# Patient Record
Sex: MALE | Race: WHITE | NOT HISPANIC OR LATINO | Employment: STUDENT | URBAN - METROPOLITAN AREA
[De-identification: names, ages, dates, MRNs, and addresses within clinical notes are randomized per-mention and may not be internally consistent; named-entity substitution may affect disease eponyms.]

---

## 2021-03-30 ENCOUNTER — EVALUATION (OUTPATIENT)
Dept: PHYSICAL THERAPY | Facility: CLINIC | Age: 21
End: 2021-03-30

## 2021-03-30 DIAGNOSIS — S43.61XA SPRAIN OF RIGHT STERNOCLAVICULAR JOINT, INITIAL ENCOUNTER: Primary | ICD-10-CM

## 2021-03-30 NOTE — PROGRESS NOTES
PT Evaluation     Today's date: 3/30/2021  Patient name: Cait Godfrey  : 2000  MRN: [id number]  Referring provider: Kaylyn Sharma, PT  Dx:   Encounter Diagnosis     ICD-10-CM    1  Sprain of right sternoclavicular joint, initial encounter  S43  61XA                   Assessment/Plan   Pt presents with Right SC Joint Sprain as well as Costochondritis due to trauma  Pt was instructed in gentle mobility to preserve ROM and gentle isometrics to preserve muscle strength  Pt will follow up in a few days to monitor progress and refer appropriately    Subjective   Patient reported he was blind sided on Saturday at lax practice  Reports was checked from the right and had immediate right sided chest and shoulder pain  Reports pain with elevation of right shoulder  Unable to lie on right side    Objective  TTP right SC joint as well as along sternum at multiple costochondral joints    Edema present in medial pec and below clavicle    Pt's right shoulder AROM limited due to pain        Precautions: Standard      Manuals                                                                 Neuro Re-Ed                                                                                                        Ther Ex                                                                                                                     Ther Activity                                       Gait Training                                       Modalities

## 2022-07-11 ENCOUNTER — APPOINTMENT (OUTPATIENT)
Dept: RADIOLOGY | Facility: CLINIC | Age: 22
End: 2022-07-11
Payer: COMMERCIAL

## 2022-07-11 VITALS
WEIGHT: 190 LBS | SYSTOLIC BLOOD PRESSURE: 127 MMHG | BODY MASS INDEX: 26.6 KG/M2 | DIASTOLIC BLOOD PRESSURE: 76 MMHG | HEART RATE: 85 BPM | HEIGHT: 71 IN

## 2022-07-11 DIAGNOSIS — M23.91 INTERNAL DERANGEMENT OF RIGHT KNEE: Primary | ICD-10-CM

## 2022-07-11 DIAGNOSIS — M25.561 RIGHT KNEE PAIN, UNSPECIFIED CHRONICITY: ICD-10-CM

## 2022-07-11 PROCEDURE — 73562 X-RAY EXAM OF KNEE 3: CPT

## 2022-07-11 PROCEDURE — 99204 OFFICE O/P NEW MOD 45 MIN: CPT | Performed by: ORTHOPAEDIC SURGERY

## 2022-07-11 NOTE — PROGRESS NOTES
Assessment/Plan:  1  Internal derangement of right knee  XR knee 3 vw right non injury    MRI knee right wo contrast     Adebayo Oscar has right-sided knee pain and I am concern for an intra-articular injury and possible ligamentous tear that may have occurred with his injury 2 years ago and was never fully evaluated  I am concerned of his mechanical symptoms that he is reporting to me today  His x-rays are unremarkable  I recommended proceeding with an MRI of the right knee at this time for further evaluation  He will follow up in the office after the MRI is complete and we can review treatment options  Subjective:   Serenity Ellis is a 25 y o  male who presents to the office for evaluation for right-sided knee pain  He had an injury 2 years ago in college lacrosse when he twisted his right knee and pain and discomfort within the knee  He was treated by his athletic training staff at school with a hinged knee brace for the wrist season  Was treated as a potential MCL sprain  He did not have a physician visit or MRI at that time  States the knee is never felt the same since  He has intermittent episodes of severe pain in the right knee that has remained persistent  He describes sharp sensation of pain when he goes from seated to standing position  This also bothers him when he goes up and down stairs  He tried running recently and he had severely increased pain and swelling in the knee  He will report multiple episodes of locking and catching within the knee and feels like the knee is going to give out on him  He has tried taping and using a brace but no other treatment thus far  Review of Systems   Constitutional: Negative for chills, fever and unexpected weight change  HENT: Negative for hearing loss, nosebleeds and sore throat  Eyes: Negative for pain, redness and visual disturbance  Respiratory: Negative for cough, shortness of breath and wheezing      Cardiovascular: Negative for chest pain, palpitations and leg swelling  Gastrointestinal: Negative for abdominal pain, nausea and vomiting  Endocrine: Negative for polyphagia and polyuria  Genitourinary: Negative for dysuria and hematuria  Musculoskeletal:        See HPI   Skin: Negative for rash and wound  Neurological: Negative for dizziness, numbness and headaches  Psychiatric/Behavioral: Negative for decreased concentration and suicidal ideas  The patient is not nervous/anxious  No past medical history on file  No past surgical history on file  No family history on file  Social History     Occupational History    Not on file   Tobacco Use    Smoking status: Not on file    Smokeless tobacco: Not on file   Substance and Sexual Activity    Alcohol use: Not on file    Drug use: Not on file    Sexual activity: Not on file       No current outpatient medications on file  No Known Allergies    Objective:  Vitals:    07/11/22 0906   BP: 127/76   Pulse: 85       Right Knee Exam     Tenderness   The patient is experiencing tenderness in the medial joint line  Range of Motion   Extension: normal   Flexion: normal     Tests   Barbara:  Medial - negative Lateral - negative  Varus: negative Valgus: negative  Lachman:  Anterior - 1+      Drawer:  Anterior - 1+    Posterior - negative    Other   Erythema: absent  Sensation: normal  Pulse: present  Swelling: none  Effusion: no effusion present          Observations     Right Knee   Negative for effusion  Physical Exam  Vitals and nursing note reviewed  Constitutional:       Appearance: He is well-developed  HENT:      Head: Normocephalic and atraumatic  Eyes:      General: No scleral icterus  Conjunctiva/sclera: Conjunctivae normal    Cardiovascular:      Rate and Rhythm: Normal rate  Pulmonary:      Effort: Pulmonary effort is normal  No respiratory distress  Musculoskeletal:      Cervical back: Normal range of motion and neck supple        Right knee: No effusion  Instability Tests: Medial Barbara test negative and lateral Barbara test negative  Comments: As noted in HPI   Skin:     General: Skin is warm and dry  Neurological:      Mental Status: He is alert and oriented to person, place, and time  Psychiatric:         Behavior: Behavior normal          I have personally reviewed pertinent films in PACS and my interpretation is as follows: Three-view x-rays of the right ankle demonstrate no evidence of acute fracture  No significant degenerative changes    Mild patella baja

## 2022-07-27 ENCOUNTER — HOSPITAL ENCOUNTER (OUTPATIENT)
Dept: RADIOLOGY | Facility: HOSPITAL | Age: 22
Discharge: HOME | End: 2022-07-27
Attending: ORTHOPAEDIC SURGERY
Payer: COMMERCIAL

## 2022-07-27 DIAGNOSIS — M23.91 INTERNAL DERANGEMENT OF RIGHT KNEE: ICD-10-CM

## 2022-07-27 PROCEDURE — G1004 CDSM NDSC: HCPCS

## 2022-07-27 PROCEDURE — 73721 MRI JNT OF LWR EXTRE W/O DYE: CPT

## 2022-08-15 ENCOUNTER — OFFICE VISIT (OUTPATIENT)
Dept: OBGYN CLINIC | Facility: CLINIC | Age: 22
End: 2022-08-15
Payer: COMMERCIAL

## 2022-08-15 VITALS
WEIGHT: 190 LBS | DIASTOLIC BLOOD PRESSURE: 76 MMHG | BODY MASS INDEX: 26.6 KG/M2 | HEART RATE: 66 BPM | HEIGHT: 71 IN | SYSTOLIC BLOOD PRESSURE: 127 MMHG

## 2022-08-15 DIAGNOSIS — M25.561 PATELLOFEMORAL JOINT PAIN, RIGHT: Primary | ICD-10-CM

## 2022-08-15 PROCEDURE — 99213 OFFICE O/P EST LOW 20 MIN: CPT | Performed by: ORTHOPAEDIC SURGERY

## 2022-08-15 NOTE — PROGRESS NOTES
Assessment/Plan:  1  Patellofemoral joint pain, right         Lum Alexis has an MRI of his right knee which shows no evidence of abnormality  On reexamination today it feels like his knee pain is more patellofemoral in nature rather than meniscal and his MRI is reassuring  I recommended home exercises and or possibly physical therapy  He declined formal physical therapy will do home exercises  He could consider a patellar stabilizing knee brace  He will follow up if the pain persists or worsens  Subjective:   Capri Ramos is a 25 y o  male who presents to the office for follow-up for right-sided knee pain  He was last seen in the office 1 month ago with ongoing discomfort in his right knee following his lacrosse injury 2 years ago  He has been feeling pain in the anterior and medial aspect of the right knee for 2 years  At last office visit we sent her for an MRI to rule out meniscus tear  He states his knee has been feeling better and only feels occasional discomfort to the front of the knee especially after running  Review of Systems   Constitutional: Negative for chills, fever and unexpected weight change  HENT: Negative for hearing loss, nosebleeds and sore throat  Eyes: Negative for pain, redness and visual disturbance  Respiratory: Negative for cough, shortness of breath and wheezing  Cardiovascular: Negative for chest pain, palpitations and leg swelling  Gastrointestinal: Negative for abdominal pain, nausea and vomiting  Endocrine: Negative for polyphagia and polyuria  Genitourinary: Negative for dysuria and hematuria  Musculoskeletal:        See HPI   Skin: Negative for rash and wound  Neurological: Negative for dizziness, numbness and headaches  Psychiatric/Behavioral: Negative for decreased concentration and suicidal ideas  The patient is not nervous/anxious  No past medical history on file  No past surgical history on file      No family history on file     Social History     Occupational History    Not on file   Tobacco Use    Smoking status: Not on file    Smokeless tobacco: Not on file   Substance and Sexual Activity    Alcohol use: Not on file    Drug use: Not on file    Sexual activity: Not on file       No current outpatient medications on file  No Known Allergies    Objective:  Vitals:    08/15/22 1418   BP: 127/76   Pulse: 66       Right Knee Exam     Tenderness   The patient is experiencing tenderness in the patella and medial retinaculum  Range of Motion   Extension: normal   Flexion: normal     Tests   Barbara:  Medial - negative Lateral - negative  Varus: negative Valgus: negative    Other   Erythema: absent  Sensation: normal  Pulse: present  Swelling: none  Effusion: no effusion present          Observations     Right Knee   Negative for effusion  Physical Exam  Vitals and nursing note reviewed  Constitutional:       Appearance: He is well-developed  HENT:      Head: Normocephalic and atraumatic  Eyes:      General: No scleral icterus  Extraocular Movements: Extraocular movements intact  Conjunctiva/sclera: Conjunctivae normal    Cardiovascular:      Rate and Rhythm: Normal rate  Pulmonary:      Effort: Pulmonary effort is normal  No respiratory distress  Musculoskeletal:      Cervical back: Normal range of motion and neck supple  Right knee: No effusion  Instability Tests: Medial Barbara test negative and lateral Barbara test negative  Comments: As noted in HPI   Skin:     General: Skin is warm and dry  Neurological:      Mental Status: He is alert and oriented to person, place, and time  Psychiatric:         Behavior: Behavior normal          I have personally reviewed pertinent films in PACS and my interpretation is as follows:  MRI of the right knee demonstrates no evidence of tear  otherwise normal MRI